# Patient Record
Sex: MALE | Race: WHITE | NOT HISPANIC OR LATINO | Employment: FULL TIME | URBAN - METROPOLITAN AREA
[De-identification: names, ages, dates, MRNs, and addresses within clinical notes are randomized per-mention and may not be internally consistent; named-entity substitution may affect disease eponyms.]

---

## 2021-07-28 ENCOUNTER — HOSPITAL ENCOUNTER (EMERGENCY)
Facility: HOSPITAL | Age: 31
Discharge: HOME | End: 2021-07-29
Attending: EMERGENCY MEDICINE

## 2021-07-28 DIAGNOSIS — J06.9 VIRAL URI WITH COUGH: Primary | ICD-10-CM

## 2021-07-28 DIAGNOSIS — U07.1 COVID-19 VIRUS INFECTION: ICD-10-CM

## 2021-07-28 PROCEDURE — 99285 EMERGENCY DEPT VISIT HI MDM: CPT | Performed by: EMERGENCY MEDICINE

## 2021-07-28 PROCEDURE — 99284 EMERGENCY DEPT VISIT MOD MDM: CPT

## 2021-07-28 RX ORDER — PREDNISONE 20 MG/1
40 TABLET ORAL ONCE
Status: CANCELLED | OUTPATIENT
Start: 2021-07-29 | End: 2021-07-29

## 2021-07-29 ENCOUNTER — APPOINTMENT (EMERGENCY)
Dept: RADIOLOGY | Facility: HOSPITAL | Age: 31
End: 2021-07-29

## 2021-07-29 VITALS
OXYGEN SATURATION: 96 % | DIASTOLIC BLOOD PRESSURE: 96 MMHG | BODY MASS INDEX: 23.18 KG/M2 | RESPIRATION RATE: 16 BRPM | SYSTOLIC BLOOD PRESSURE: 141 MMHG | WEIGHT: 148 LBS | HEART RATE: 92 BPM | TEMPERATURE: 98.3 F

## 2021-07-29 LAB
ATRIAL RATE: 76 BPM
P AXIS: 62 DEGREES
PR INTERVAL: 118 MS
QRS AXIS: 83 DEGREES
QRSD INTERVAL: 102 MS
QT INTERVAL: 384 MS
QTC INTERVAL: 432 MS
SARS-COV-2 RNA RESP QL NAA+PROBE: POSITIVE
T WAVE AXIS: 49 DEGREES
VENTRICULAR RATE: 76 BPM

## 2021-07-29 PROCEDURE — U0005 INFEC AGEN DETEC AMPLI PROBE: HCPCS | Performed by: EMERGENCY MEDICINE

## 2021-07-29 PROCEDURE — 93010 ELECTROCARDIOGRAM REPORT: CPT | Performed by: INTERNAL MEDICINE

## 2021-07-29 PROCEDURE — 93005 ELECTROCARDIOGRAM TRACING: CPT

## 2021-07-29 PROCEDURE — U0003 INFECTIOUS AGENT DETECTION BY NUCLEIC ACID (DNA OR RNA); SEVERE ACUTE RESPIRATORY SYNDROME CORONAVIRUS 2 (SARS-COV-2) (CORONAVIRUS DISEASE [COVID-19]), AMPLIFIED PROBE TECHNIQUE, MAKING USE OF HIGH THROUGHPUT TECHNOLOGIES AS DESCRIBED BY CMS-2020-01-R: HCPCS | Performed by: EMERGENCY MEDICINE

## 2021-07-29 PROCEDURE — 71046 X-RAY EXAM CHEST 2 VIEWS: CPT

## 2021-07-29 NOTE — ED ATTENDING ATTESTATION
7/28/2021  I, Becca Evans DO, saw and evaluated the patient  I have discussed the patient with the resident/non-physician practitioner and agree with the resident's/non-physician practitioner's findings, Plan of Care, and MDM as documented in the resident's/non-physician practitioner's note, except where noted  All available labs and Radiology studies were reviewed  I was present for key portions of any procedure(s) performed by the resident/non-physician practitioner and I was immediately available to provide assistance  At this point I agree with the current assessment done in the Emergency Department  I have conducted an independent evaluation of this patient a history and physical is as follows:    44-year-old male patient with history of asthma says the last 3 days he has had some mild shortness of breath, relatively constant but gets worse when he is in a cold environment  He has some slight cough productive of whitish sputum  No fever, no chills, no change in taste or smell, no chest pain, no palpitations, no orthopnea, no dyspnea on exertion  No myalgias or arthralgias, no travel history, sick contacts  Was vaccinated against COVID with the [employer] vaccine, in May of 2021    General:  Patient is well-appearing  Head:  Atraumatic  Eyes:  Conjunctiva pink  ENT:  Mucous membranes are moist  Neck:  Supple  Cardiac:  S1-S2, without murmurs  Lungs:  Clear to auscultation bilaterally  Abdomen:  Soft, nontender, normal bowel sounds, no CVA tenderness, no tympany, no rigidity, no guarding  Extremities:  Normal range of motion, no pedal edema or calf asymmetry, radial pulses are equal and symmetric bilaterally  Neurologic:  Awake, fluent speech, normal comprehension, AAOx3  Skin:  Pink warm and dry  Psychiatric:  Alert, pleasant, cooperative      ED Course     Suspect the patient may have a viral syndrome, Plan is for ECG and chest x-ray, as well as COVID swab  I do not believe this patient's complaints are from pulmonary embolism and I believe they would most likely be harmed through false positive test results and other complications of testing by further pursuing the diagnosis of pulmonary embolism  If unremarkable, will discharge with outpatient follow-up   Signed out to Dr Wendy Wilkins Time  Procedures

## 2021-11-18 ENCOUNTER — HOSPITAL ENCOUNTER (OUTPATIENT)
Dept: RADIOLOGY | Facility: HOSPITAL | Age: 31
Discharge: HOME | End: 2021-11-18
Attending: PODIATRIST

## 2021-11-18 DIAGNOSIS — S90.31XA CONTUSION OF RIGHT FOOT, INITIAL ENCOUNTER: ICD-10-CM

## 2021-11-18 PROCEDURE — G1004 CDSM NDSC: HCPCS

## 2021-11-18 PROCEDURE — 73718 MRI LOWER EXTREMITY W/O DYE: CPT

## 2022-11-28 ENCOUNTER — HOSPITAL ENCOUNTER (EMERGENCY)
Facility: HOSPITAL | Age: 32
Discharge: HOME | End: 2022-11-29
Attending: EMERGENCY MEDICINE

## 2022-11-28 VITALS
TEMPERATURE: 100.7 F | RESPIRATION RATE: 18 BRPM | SYSTOLIC BLOOD PRESSURE: 138 MMHG | HEART RATE: 90 BPM | OXYGEN SATURATION: 95 % | DIASTOLIC BLOOD PRESSURE: 79 MMHG

## 2022-11-28 DIAGNOSIS — J10.1 INFLUENZA A: Primary | ICD-10-CM

## 2022-11-28 NOTE — Clinical Note
Darlene Bowlingsus was seen and treated in our emergency department on 11/28/2022  No restrictions            Diagnosis: Influenza    Peña Edgar  may return to work on return date  He may return on this date: 12/03/2022         If you have any questions or concerns, please don't hesitate to call        Abdulaziz Gaming MD    ______________________________           _______________          _______________  Hospital Representative                              Date                                Time

## 2022-11-29 ENCOUNTER — APPOINTMENT (EMERGENCY)
Dept: RADIOLOGY | Facility: HOSPITAL | Age: 32
End: 2022-11-29

## 2022-11-29 LAB
ALBUMIN SERPL BCP-MCNC: 4.5 G/DL (ref 3.5–5)
ALP SERPL-CCNC: 46 U/L (ref 34–104)
ALT SERPL W P-5'-P-CCNC: 12 U/L (ref 7–52)
ANION GAP SERPL CALCULATED.3IONS-SCNC: 10 MMOL/L (ref 4–13)
AST SERPL W P-5'-P-CCNC: 15 U/L (ref 13–39)
ATRIAL RATE: 89 BPM
BASOPHILS # BLD AUTO: 0.02 THOUSANDS/ÂΜL (ref 0–0.1)
BASOPHILS NFR BLD AUTO: 0 % (ref 0–1)
BILIRUB SERPL-MCNC: 1.09 MG/DL (ref 0.2–1)
BUN SERPL-MCNC: 19 MG/DL (ref 5–25)
CALCIUM SERPL-MCNC: 9.7 MG/DL (ref 8.4–10.2)
CARDIAC TROPONIN I PNL SERPL HS: 2 NG/L
CHLORIDE SERPL-SCNC: 102 MMOL/L (ref 96–108)
CO2 SERPL-SCNC: 26 MMOL/L (ref 21–32)
CREAT SERPL-MCNC: 0.84 MG/DL (ref 0.6–1.3)
EOSINOPHIL # BLD AUTO: 0.15 THOUSAND/ÂΜL (ref 0–0.61)
EOSINOPHIL NFR BLD AUTO: 2 % (ref 0–6)
ERYTHROCYTE [DISTWIDTH] IN BLOOD BY AUTOMATED COUNT: 12.7 % (ref 11.6–15.1)
FLUAV RNA RESP QL NAA+PROBE: POSITIVE
FLUBV RNA RESP QL NAA+PROBE: NEGATIVE
GFR SERPL CREATININE-BSD FRML MDRD: 116 ML/MIN/1.73SQ M
GLUCOSE SERPL-MCNC: 90 MG/DL (ref 65–140)
HCT VFR BLD AUTO: 45.9 % (ref 36.5–49.3)
HGB BLD-MCNC: 15.7 G/DL (ref 12–17)
IMM GRANULOCYTES # BLD AUTO: 0.02 THOUSAND/UL (ref 0–0.2)
IMM GRANULOCYTES NFR BLD AUTO: 0 % (ref 0–2)
LIPASE SERPL-CCNC: 12 U/L (ref 11–82)
LYMPHOCYTES # BLD AUTO: 0.34 THOUSANDS/ÂΜL (ref 0.6–4.47)
LYMPHOCYTES NFR BLD AUTO: 5 % (ref 14–44)
MCH RBC QN AUTO: 30.5 PG (ref 26.8–34.3)
MCHC RBC AUTO-ENTMCNC: 34.2 G/DL (ref 31.4–37.4)
MCV RBC AUTO: 89 FL (ref 82–98)
MONOCYTES # BLD AUTO: 0.57 THOUSAND/ÂΜL (ref 0.17–1.22)
MONOCYTES NFR BLD AUTO: 9 % (ref 4–12)
NEUTROPHILS # BLD AUTO: 5.61 THOUSANDS/ÂΜL (ref 1.85–7.62)
NEUTS SEG NFR BLD AUTO: 84 % (ref 43–75)
NRBC BLD AUTO-RTO: 0 /100 WBCS
P AXIS: 39 DEGREES
PLATELET # BLD AUTO: 150 THOUSANDS/UL (ref 149–390)
PMV BLD AUTO: 10.3 FL (ref 8.9–12.7)
POTASSIUM SERPL-SCNC: 3.8 MMOL/L (ref 3.5–5.3)
PR INTERVAL: 118 MS
PROT SERPL-MCNC: 7.7 G/DL (ref 6.4–8.4)
QRS AXIS: 21 DEGREES
QRSD INTERVAL: 108 MS
QT INTERVAL: 350 MS
QTC INTERVAL: 425 MS
RBC # BLD AUTO: 5.15 MILLION/UL (ref 3.88–5.62)
RSV RNA RESP QL NAA+PROBE: NEGATIVE
SARS-COV-2 RNA RESP QL NAA+PROBE: NEGATIVE
SODIUM SERPL-SCNC: 138 MMOL/L (ref 135–147)
T WAVE AXIS: 51 DEGREES
VENTRICULAR RATE: 89 BPM
WBC # BLD AUTO: 6.71 THOUSAND/UL (ref 4.31–10.16)

## 2022-11-29 RX ORDER — ONDANSETRON 2 MG/ML
4 INJECTION INTRAMUSCULAR; INTRAVENOUS ONCE
Status: COMPLETED | OUTPATIENT
Start: 2022-11-29 | End: 2022-11-29

## 2022-11-29 RX ORDER — PANTOPRAZOLE SODIUM 40 MG/10ML
40 INJECTION, POWDER, LYOPHILIZED, FOR SOLUTION INTRAVENOUS ONCE
Status: COMPLETED | OUTPATIENT
Start: 2022-11-29 | End: 2022-11-29

## 2022-11-29 RX ORDER — ACETAMINOPHEN 160 MG/5ML
650 SUSPENSION, ORAL (FINAL DOSE FORM) ORAL ONCE
Status: COMPLETED | OUTPATIENT
Start: 2022-11-29 | End: 2022-11-29

## 2022-11-29 RX ADMIN — Medication 650 MG: at 00:56

## 2022-11-29 RX ADMIN — SODIUM CHLORIDE 1000 ML: 0.9 INJECTION, SOLUTION INTRAVENOUS at 00:34

## 2022-11-29 RX ADMIN — ONDANSETRON 4 MG: 2 INJECTION INTRAMUSCULAR; INTRAVENOUS at 00:34

## 2022-11-29 RX ADMIN — PANTOPRAZOLE SODIUM 40 MG: 40 INJECTION, POWDER, FOR SOLUTION INTRAVENOUS at 00:34

## 2022-11-29 NOTE — ED PROVIDER NOTES
History  Chief Complaint   Patient presents with   • Abdominal Pain     Pt states he has cough, fever, vomiting blood x 1, abdominal pain, chest pain, headache all started day  28-year-old male with no significant past medical history presents to the emergency department for evaluation of flu like symptoms  The patient reports that he went to bed feeling well and then around 9:00 a m  this morning developed a cough and chills  He reports that he went to work at 9:00 p m  and then had an episode of nausea and vomiting  He reports that part of his vomit was red and he believes it is blood  Denies prior history of vomiting blood  He has associated chest pain that he describes as a dull 2/10 but becomes sharp and more intense with coughing  He also reports having epigastric abdominal pain  He did not take any medications to treat his symptoms prior to arrival   The patient states that he initially called for an ambulance which brought him to a different emergency department  He states that when he arrived at that emergency department he was placed in the waiting room and was told that it would be 2 hours before he would be seen so he left and came here for evaluation  The patient states his last bowel movement was yesterday and was normal for him  None       History reviewed  No pertinent past medical history  History reviewed  No pertinent surgical history  History reviewed  No pertinent family history  I have reviewed and agree with the history as documented  E-Cigarette/Vaping   • E-Cigarette Use Never User      E-Cigarette/Vaping Substances     Social History     Tobacco Use   • Smoking status: Never   • Smokeless tobacco: Never   Vaping Use   • Vaping Use: Never used   Substance Use Topics   • Alcohol use: Yes     Comment: social   • Drug use: Yes     Types: Marijuana       Review of Systems   Constitutional: Positive for chills and fever     HENT: Negative for ear pain and sore throat  Eyes: Negative for pain and visual disturbance  Respiratory: Positive for cough  Negative for shortness of breath  Cardiovascular: Positive for chest pain  Negative for palpitations  Gastrointestinal: Positive for abdominal pain, nausea and vomiting  Negative for diarrhea  Genitourinary: Negative for dysuria and hematuria  Musculoskeletal: Negative for arthralgias and back pain  Skin: Negative for color change and rash  Neurological: Negative for seizures and syncope  All other systems reviewed and are negative  Physical Exam  Physical Exam  Vitals and nursing note reviewed  Constitutional:       General: He is not in acute distress  Appearance: He is well-developed  He is ill-appearing  HENT:      Head: Normocephalic and atraumatic  Mouth/Throat:      Pharynx: Oropharynx is clear  Eyes:      Conjunctiva/sclera: Conjunctivae normal    Cardiovascular:      Rate and Rhythm: Normal rate and regular rhythm  Pulses:           Radial pulses are 2+ on the right side and 2+ on the left side  Dorsalis pedis pulses are 2+ on the right side and 2+ on the left side  Heart sounds: No murmur heard  Pulmonary:      Effort: Pulmonary effort is normal  No respiratory distress  Breath sounds: Normal breath sounds  Abdominal:      Palpations: Abdomen is soft  Tenderness: There is abdominal tenderness (mild) in the epigastric area  Musculoskeletal:         General: No swelling  Cervical back: Neck supple  Skin:     General: Skin is warm and dry  Capillary Refill: Capillary refill takes less than 2 seconds  Neurological:      Mental Status: He is alert and oriented to person, place, and time     Psychiatric:         Mood and Affect: Mood normal          Behavior: Behavior normal          Vital Signs  ED Triage Vitals [11/28/22 2344]   Temperature Pulse Respirations Blood Pressure SpO2   (!) 100 7 °F (38 2 °C) 90 18 138/79 95 %      Temp src Heart Rate Source Patient Position - Orthostatic VS BP Location FiO2 (%)   -- Monitor Lying Left arm --      Pain Score       3           Vitals:    11/28/22 2344   BP: 138/79   Pulse: 90   Patient Position - Orthostatic VS: Lying         Visual Acuity      ED Medications  Medications   sodium chloride 0 9 % bolus 1,000 mL (0 mL Intravenous Stopped 11/29/22 0103)   ondansetron (ZOFRAN) injection 4 mg (4 mg Intravenous Given 11/29/22 0034)   pantoprazole (PROTONIX) injection 40 mg (40 mg Intravenous Given 11/29/22 0034)   acetaminophen (TYLENOL) oral suspension 650 mg (650 mg Oral Given 11/29/22 0056)       Diagnostic Studies  Results Reviewed     Procedure Component Value Units Date/Time    FLU/RSV/COVID - if FLU/RSV clinically relevant [[id number]]  (Abnormal) Collected: 11/29/22 0007    Lab Status: Final result Specimen: Nares from Nose Updated: 11/29/22 0048     SARS-CoV-2 Negative     INFLUENZA A PCR Positive     INFLUENZA B PCR Negative     RSV PCR Negative    Narrative:      FOR PEDIATRIC PATIENTS - copy/paste COVID Guidelines URL to browser: [email or web address]    SARS-CoV-2 assay is a Nucleic Acid Amplification assay intended for the  qualitative detection of nucleic acid from SARS-CoV-2 in nasopharyngeal  swabs  Results are for the presumptive identification of SARS-CoV-2 RNA  Positive results are indicative of infection with SARS-CoV-2, the virus  causing COVID-19, but do not rule out bacterial infection or co-infection  with other viruses  Laboratories within the [place] and its  territories are required to report all positive results to the appropriate  public health authorities  Negative results do not preclude SARS-CoV-2  infection and should not be used as the sole basis for treatment or other  patient management decisions   Negative results must be combined with  clinical observations, patient history, and epidemiological information  This test has not been FDA cleared or approved  This test has been authorized by FDA under an Emergency Use Authorization  (EUA)  This test is only authorized for the duration of time the  declaration that circumstances exist justifying the authorization of the  emergency use of an in vitro diagnostic tests for detection of SARS-CoV-2  virus and/or diagnosis of COVID-19 infection under section 564(b)(1) of  the Act, 21 U  S C  420XOF-4(E)(2), unless the authorization is terminated  or revoked sooner  The test has been validated but independent review by FDA  and CLIA is pending  Test performed using [employer] GeneXpert: This RT-PCR assay targets N2,  a region unique to SARS-CoV-2  A conserved region in the E-gene was chosen  for pan-Sarbecovirus detection which includes SARS-CoV-2  According to CMS-2020-01-R, this platform meets the definition of high-throughput technology      HS Troponin 0hr (reflex protocol) [[id number]]  (Normal) Collected: 11/29/22 0007    Lab Status: Final result Specimen: Blood from Arm, Left Updated: 11/29/22 0035     hs TnI 0hr 2 ng/L     Comprehensive metabolic panel [[id number]]  (Abnormal) Collected: 11/29/22 0007    Lab Status: Final result Specimen: Blood from Arm, Left Updated: 11/29/22 0029     Sodium 138 mmol/L      Potassium 3 8 mmol/L      Chloride 102 mmol/L      CO2 26 mmol/L      ANION GAP 10 mmol/L      BUN 19 mg/dL      Creatinine 0 84 mg/dL      Glucose 90 mg/dL      Calcium 9 7 mg/dL      AST 15 U/L      ALT 12 U/L      Alkaline Phosphatase 46 U/L      Total Protein 7 7 g/dL      Albumin 4 5 g/dL      Total Bilirubin 1 09 mg/dL      eGFR 116 ml/min/1 73sq m     Narrative:      Michelle guidelines for Chronic Kidney Disease (CKD):   •  Stage 1 with normal or high GFR (GFR > 90 mL/min/1 73 square meters)  •  Stage 2 Mild CKD (GFR = 60-89 mL/min/1 73 square meters)  •  Stage 3A Moderate CKD (GFR = 45-59 mL/min/1 73 square meters)  • Stage 3B Moderate CKD (GFR = 30-44 mL/min/1 73 square meters)  •  Stage 4 Severe CKD (GFR = 15-29 mL/min/1 73 square meters)  •  Stage 5 End Stage CKD (GFR <15 mL/min/1 73 square meters)  Note: GFR calculation is accurate only with a steady state creatinine    Lipase [[id number]]  (Normal) Collected: 11/29/22 0007    Lab Status: Final result Specimen: Blood from Arm, Left Updated: 11/29/22 0029     Lipase 12 u/L     CBC and differential [[id number]]  (Abnormal) Collected: 11/29/22 0007    Lab Status: Final result Specimen: Blood from Arm, Left Updated: 11/29/22 0015     WBC 6 71 Thousand/uL      RBC 5 15 Million/uL      Hemoglobin 15 7 g/dL      Hematocrit 45 9 %      MCV 89 fL      MCH 30 5 pg      MCHC 34 2 g/dL      RDW 12 7 %      MPV 10 3 fL      Platelets 151 Thousands/uL      nRBC 0 /100 WBCs      Neutrophils Relative 84 %      Immat GRANS % 0 %      Lymphocytes Relative 5 %      Monocytes Relative 9 %      Eosinophils Relative 2 %      Basophils Relative 0 %      Neutrophils Absolute 5 61 Thousands/µL      Immature Grans Absolute 0 02 Thousand/uL      Lymphocytes Absolute 0 34 Thousands/µL      Monocytes Absolute 0 57 Thousand/µL      Eosinophils Absolute 0 15 Thousand/µL      Basophils Absolute 0 02 Thousands/µL                  XR chest 2 views   ED Interpretation by Cassandra Price MD (11/29 0038)   No acute cardiopulmonary process                 Procedures  ECG 12 Lead Documentation Only    Date/Time: 11/29/2022 12:11 AM  Performed by: Cassandra Price MD  Authorized by: Cassandra Price MD     ECG reviewed by me, the ED Provider: yes    Patient location:  ED  Previous ECG:     Previous ECG:  Unavailable    Comparison to cardiac monitor: Yes    Interpretation:     Interpretation: normal    Rate:     ECG rate assessment: normal    Rhythm:     Rhythm: sinus rhythm    Ectopy:     Ectopy: none    QRS:     QRS axis:  Normal  Conduction:     Conduction: normal    ST segments:     ST segments: Normal  T waves:     T waves: normal               ED Course                 SBIRT 22yo+    Flowsheet Row Most Recent Value   SBIRT (23 yo +)    In order to provide better care to our patients, we are screening all of our patients for alcohol and drug use  Would it be okay to ask you these screening questions? No Filed at: 11/28/2022 7756                    [facility]  Number of Diagnoses or Management Options  Influenza A  Diagnosis management comments: 20-year-old male presented to the emergency department for evaluation of flu like symptoms  On arrival the patient was awake, alert and in no acute distress  Initial vital signs show that the patient was febrile with a temperature of 100 7°  Patient had mild tenderness to palpation of his epigastric area  Physical exam was otherwise unremarkable  Workup done in the emergency department showed the patient was positive for influenza A  Workup was otherwise unremarkable  The patient was treated symptomatically and on re-evaluation reported some improvement of his symptoms  All diagnostic studies were discussed with the patient in detail  He is appropriate for discharge at this time  Recommendation was made to establish care with a PCP and to follow-up for continued symptoms  Return precautions were discussed  Patient agrees with the plan for discharge and feels comfortable to go home with proper f/u  Advised to return for worsening or additional problems  Diagnostic tests were reviewed and questions answered  Diagnosis, care plan and treatment options were discussed  The patient understands instructions and will follow up as directed            Disposition  Final diagnoses:   Influenza A     Time reflects when diagnosis was documented in both MDM as applicable and the Disposition within this note     Time User Action Codes Description Comment    11/29/2022 12:50 AM Felix Lord Add [J10 1] Influenza A       ED Disposition     ED Disposition   Discharge Condition   Stable    Date/Time   Tue Nov 29, 2022 12:50 AM    Comment   Leonie Skill discharge to home/self care  Follow-up Information     Follow up With Specialties Details Why Contact Info Additional Information    Infolink  Call  To establish care with a PCP [phone]  [address] [place] [address] Emergency Department Emergency Medicine Go to  If symptoms worsen [id number] [facility],[phone]  [address] Emergency Department, [address], [address] [place] [address]          There are no discharge medications for this patient  No discharge procedures on file      PDMP Review     None          ED Provider  Electronically Signed by           Miroslava Chaves MD  11/29/22 0905

## 2024-12-26 ENCOUNTER — HOSPITAL ENCOUNTER (EMERGENCY)
Facility: HOSPITAL | Age: 34
Discharge: HOME | End: 2024-12-26
Attending: EMERGENCY MEDICINE
Payer: COMMERCIAL

## 2024-12-26 VITALS
TEMPERATURE: 98.2 F | SYSTOLIC BLOOD PRESSURE: 151 MMHG | RESPIRATION RATE: 18 BRPM | OXYGEN SATURATION: 99 % | HEART RATE: 76 BPM | DIASTOLIC BLOOD PRESSURE: 94 MMHG

## 2024-12-26 DIAGNOSIS — K08.89 DENTALGIA: ICD-10-CM

## 2024-12-26 DIAGNOSIS — K04.7 DENTAL INFECTION: Primary | ICD-10-CM

## 2024-12-26 PROCEDURE — 99282 EMERGENCY DEPT VISIT SF MDM: CPT

## 2024-12-26 PROCEDURE — 99284 EMERGENCY DEPT VISIT MOD MDM: CPT | Performed by: EMERGENCY MEDICINE

## 2024-12-26 RX ORDER — ACETAMINOPHEN 325 MG/1
650 TABLET ORAL ONCE
Status: COMPLETED | OUTPATIENT
Start: 2024-12-26 | End: 2024-12-26

## 2024-12-26 RX ORDER — AMOXICILLIN 500 MG/1
500 CAPSULE ORAL EVERY 8 HOURS SCHEDULED
Qty: 15 CAPSULE | Refills: 0 | Status: SHIPPED | OUTPATIENT
Start: 2024-12-26 | End: 2024-12-31

## 2024-12-26 RX ORDER — AMOXICILLIN 250 MG/1
500 CAPSULE ORAL ONCE
Status: COMPLETED | OUTPATIENT
Start: 2024-12-26 | End: 2024-12-26

## 2024-12-26 RX ORDER — CHLORHEXIDINE GLUCONATE ORAL RINSE 1.2 MG/ML
15 SOLUTION DENTAL 2 TIMES DAILY
Qty: 420 ML | Refills: 0 | Status: SHIPPED | OUTPATIENT
Start: 2024-12-26 | End: 2025-01-09

## 2024-12-26 RX ORDER — NAPROXEN SODIUM 220 MG/1
220 TABLET, FILM COATED ORAL 2 TIMES DAILY WITH MEALS
Qty: 60 TABLET | Refills: 0 | Status: SHIPPED | OUTPATIENT
Start: 2024-12-26

## 2024-12-26 RX ORDER — IBUPROFEN 600 MG/1
600 TABLET, FILM COATED ORAL ONCE
Status: COMPLETED | OUTPATIENT
Start: 2024-12-26 | End: 2024-12-26

## 2024-12-26 RX ORDER — SENNOSIDES 8.6 MG
650 CAPSULE ORAL EVERY 8 HOURS PRN
Qty: 30 TABLET | Refills: 0 | Status: SHIPPED | OUTPATIENT
Start: 2024-12-26

## 2024-12-26 RX ADMIN — AMOXICILLIN 500 MG: 250 CAPSULE ORAL at 17:51

## 2024-12-26 RX ADMIN — ACETAMINOPHEN 650 MG: 325 TABLET, FILM COATED ORAL at 17:51

## 2024-12-26 RX ADMIN — IBUPROFEN 600 MG: 600 TABLET, FILM COATED ORAL at 17:51

## 2024-12-26 NOTE — ED PROVIDER NOTES
Time reflects when diagnosis was documented in both MDM as applicable and the Disposition within this note       Time User Action Codes Description Comment    12/26/2024  5:45 PM Germania Barbosa Add [K04.7] Dental infection     12/26/2024  5:47 PM Germania Barbosa Add [K08.89] Dentalgia           ED Disposition       ED Disposition   Discharge    Condition   Stable    Date/Time   Thu Dec 26, 2024  5:52 PM    Comment   Jose J Chen discharge to home/self care.                   Assessment & Plan       Medical Decision Making  Risk  OTC drugs.  Prescription drug management.      Patient is a 34 y.o. male   who presents to the ED with CC dental pain for approximately one week. Has been taking naproxen and intermittently taking amoxicllin     Vital signs stable, afebrile. Exam as listed below.      Differential diagnosis includes but is not limited to chronically poor presentation with dental caries; gingivitis, possible periapical abscess.  No abscess present on my exam that is able to be drained.    Plan - We will give tylenol/motrin for pain.  - We will write for chlorhexidine to sterilize the area and prevent further worsening of infection or repeat infection.  - Given the appearance, we will also do antibiotics:  - The patient will follow-up with her primary care or dentist for re-evaluation of her symptoms.  - The patient has no red flags for Jerry's or serious dental infection at this juncture. The patient doesn't appear toxic, nor has rapid progression of symptoms. Patient isn't immunocompromised. Patient has no SOB nor trouble swallowing. Patient doesn't appear dehydrated nor demonstrates trismus on exam.       View ED course above for further discussion on patient workup.     All labs reviewed and utilized in the medical decision making process  All radiology studies independently viewed by me and interpreted by the radiologist.  I reviewed all testing with the patient.     Upon re-evaluation patient stable  for discharge.  Information on dental clinics and dental referral provided.           Medications   amoxicillin (AMOXIL) capsule 500 mg (500 mg Oral Given 12/26/24 1751)   acetaminophen (TYLENOL) tablet 650 mg (650 mg Oral Given 12/26/24 1751)   ibuprofen (MOTRIN) tablet 600 mg (600 mg Oral Given 12/26/24 1751)       ED Risk Strat Scores                          SBIRT 22yo+      Flowsheet Row Most Recent Value   Initial Alcohol Screen: US AUDIT-C     1. How often do you have a drink containing alcohol? 0 Filed at: 12/26/2024 1732   2. How many drinks containing alcohol do you have on a typical day you are drinking?  0 Filed at: 12/26/2024 1732   3a. Male UNDER 65: How often do you have five or more drinks on one occasion? 0 Filed at: 12/26/2024 1732   3b. FEMALE Any Age, or MALE 65+: How often do you have 4 or more drinks on one occassion? 0 Filed at: 12/26/2024 1732   Audit-C Score 0 Filed at: 12/26/2024 1732   RATNA: How many times in the past year have you...    Used an illegal drug or used a prescription medication for non-medical reasons? Never Filed at: 12/26/2024 1732                            History of Present Illness       Chief Complaint   Patient presents with    Dental Pain     Pt c/o L upper dental pain x 2 weeks.        History reviewed. No pertinent past medical history.   History reviewed. No pertinent surgical history.   History reviewed. No pertinent family history.   Social History     Tobacco Use    Smoking status: Never    Smokeless tobacco: Never   Vaping Use    Vaping status: Never Used   Substance Use Topics    Alcohol use: Yes     Comment: social    Drug use: Yes     Types: Marijuana      E-Cigarette/Vaping    E-Cigarette Use Never User       E-Cigarette/Vaping Substances      I have reviewed and agree with the history as documented.     34-year-old male with chief complaint dentalgia        Review of Systems   Constitutional:  Negative for activity change, appetite change, chills and  fever.   HENT:  Positive for dental problem. Negative for congestion, drooling, facial swelling, rhinorrhea, trouble swallowing and voice change.    Respiratory:  Negative for apnea, cough and shortness of breath.    Gastrointestinal:  Negative for abdominal distention, nausea and vomiting.   Skin:  Negative for color change and rash.   Psychiatric/Behavioral:  Negative for confusion.            Objective       ED Triage Vitals [12/26/24 1702]   Temperature Pulse Blood Pressure Respirations SpO2 Patient Position - Orthostatic VS   98.2 °F (36.8 °C) 76 151/94 18 99 % --      Temp Source Heart Rate Source BP Location FiO2 (%) Pain Score    Temporal Monitor Left arm -- 10 - Worst Possible Pain      Vitals      Date and Time Temp Pulse SpO2 Resp BP Pain Score FACES Pain Rating User   12/26/24 1751 -- -- -- -- -- 10 - Worst Possible Pain -- VB   12/26/24 1702 98.2 °F (36.8 °C) 76 99 % 18 151/94 10 - Worst Possible Pain -- ST            Physical Exam  Constitutional:       Appearance: Normal appearance.   HENT:      Head: Normocephalic and atraumatic.      Nose: Nose normal.      Mouth/Throat:      Comments: Multiple dental caries; significant decay of the left premolars.  There is tenderness along the gingival line in this area but no fluctuance.  There is diffuse periodontal disease.  No facial swelling.  No tongue elevation.  No induration underneath the tongue.  No uvular deviation.  Patient tolerating his own saliva.  No voice change  Eyes:      Pupils: Pupils are equal, round, and reactive to light.   Cardiovascular:      Rate and Rhythm: Normal rate.   Pulmonary:      Effort: Pulmonary effort is normal.   Abdominal:      General: Abdomen is flat.      Palpations: Abdomen is soft.   Skin:     General: Skin is warm and dry.      Capillary Refill: Capillary refill takes less than 2 seconds.   Neurological:      General: No focal deficit present.      Mental Status: He is alert and oriented to person, place, and time.          Results Reviewed       None            No orders to display       Procedures    ED Medication and Procedure Management   None     Discharge Medication List as of 12/26/2024  5:55 PM        START taking these medications    Details   acetaminophen (TYLENOL) 650 mg CR tablet Take 1 tablet (650 mg total) by mouth every 8 (eight) hours as needed for mild pain, Starting u 12/26/2024, Normal      amoxicillin (AMOXIL) 500 mg capsule Take 1 capsule (500 mg total) by mouth every 8 (eight) hours for 5 days, Starting u 12/26/2024, Until Tue 12/31/2024, Normal      chlorhexidine (PERIDEX) 0.12 % solution Apply 15 mL to the mouth or throat 2 (two) times a day for 14 days, Starting u 12/26/2024, Until Thu 1/9/2025, Normal      naproxen sodium (ALEVE) 220 MG tablet Take 1 tablet (220 mg total) by mouth 2 (two) times a day with meals, Starting u 12/26/2024, Normal             ED SEPSIS DOCUMENTATION   Time reflects when diagnosis was documented in both MDM as applicable and the Disposition within this note       Time User Action Codes Description Comment    12/26/2024  5:45 PM Germania Barbosa [K04.7] Dental infection     12/26/2024  5:47 PM Germania Barbosa [K08.89] Dentalgia                  Germania Barbosa MD  12/27/24 0040

## 2024-12-26 NOTE — DISCHARGE INSTRUCTIONS
You were seen today for dental pain. You may have a dental infection. Take amoxicillin every eight hours for 5 days, tylenol/motrin as pain. Your peridex mouthwash for maximum of 14 days. Follow up with dentistry for continued management.     Please use the following pain medications as prescribed:  - Tylenol 650mg every 6 hours  - Motrin 400mg every 6 hours  - Orajel OTC  They work in different ways so can be used together at the same time.     We will give a script for peridex wash. This is an anti-septic which will sterilize the area and prevent and infection from occurring. This can only be used for 2-3 weeks. If you use it longer, it might cause some pink staining of your teeth. It works like a super powered form of Listerine.     Lastly, I am starting you on antibiotics. I would like you to take the full course as prescribed. This should help the pain and swelling.     If despite the above you have worsening symptoms please return to the emergency room for re-evaluation.     If you HAVE A DENTIST, please call to make an appointment with them for follow-up as soon as possible.  If you DO NOT have a dentist, please call [phone]. It is a service that will help you find a dentist in your area and based on your insurance (if you have it or not) after you answer some questions    You may also follow-up with the [facility] if you don't have a density.   Dental Clinic - [facility]  [address] [place] [address] [id number] [phone]  They will see you with or without insurance and have walk in hours in the morning without appointments.     Other local dental clinics include:   DENTAL CLINICS    [facility]  [address] [place] [id number]  [phone]  Walk in house M-F 8a-noon  Emergency Dental Care    [facility]  [address] [place] [address], 2nd [place] [address]  [phone]    Dr. Ryan  Darren  [address] [facility] [place] [address]  Takes adults & children on a waiting list    [facility]  [address] [place] [address]  [phone]  Walk in scheduled only M-F 8a-noon & 1p-4p  Uninsured received 40% discount & payments  Payment must be made upfront before the service  Emergency Dental Care    [facility]  [address] [place] [address]  OR  [address] [place] [address]  [phone]    [facility]  Offices in [facility], [place]  [phone]

## 2024-12-26 NOTE — ED ATTENDING ATTESTATION
Final Diagnoses:     1. Dental infection    2. Dentalgia           Adolfo FINNEGAN MD, saw and evaluated the patient. All available labs and X-rays were ordered by me or the resident / non-physician and have been reviewed by myself. I discussed the patient with the resident / non-physician and agree with the resident's / non-physician practitioner's findings and plan as documented in the resident's / non-physician practicitioner's note, except where noted.   At this point, I agree with the current assessment done in the ED.   I was present during key portions of all procedures performed unless otherwise stated.     HPI:  NURSING TRIAGE:    This is a 34 y.o. male presenting for evaluation of dentalgia.  For 1 week has been taken naproxen / amoxicillin for pain intermittently.  Hasn't seen a dentist due to fear. Chief Complaint   Patient presents with    Dental Pain     Pt c/o L upper dental pain x 2 weeks.       PHYSICAL: ASSESSMENT + PLAN:   Pertinent: poor dentition around pre-molars  TTP  Periapical abscess suspected.   Multiple teeth are severely eroded.   Percussive tenderness  There are remnants of braces present.  No lymph nodes  No trismus  Uvula is midline.   No foul smell.     General: VS reviewed  Appears in NAD  awake, alert.   Well-nourished, well-developed. Appears stated age.   Speaking normally in full sentences.   Head: Normocephalic, atraumatic  Eyes: EOM-I. No diplopia.   No hyphema.   No subconjunctival hemorrhages.  Symmetrical lids.   ENT: Atraumatic external nose and ears.    MMM  No malocclusion. No stridor. Normal phonation. No drooling. Normal swallowing.   Neck: No JVD.  CV: No pallor noted  Lungs:   No tachypnea  No respiratory distress  Abd: soft nt nd no rebound/guarding  MSK:   FROM spontaneously  Skin: Dry, intact.   Neuro: Awake, alert, GCS15, CN II-XII grossly intact.   Motor grossly intact.  Psychiatric/Behavioral: interacting normally; appropriate mood/affect.    Exam:  deferred    Vitals:    12/26/24 1702   BP: 151/94   BP Location: Left arm   Pulse: 76   Resp: 18   Temp: 98.2 °F (36.8 °C)   TempSrc: Temporal   SpO2: 99%    - We will give tylenol/motrin for pain.  - We will write for chlorhexidine to sterilize the area and prevent further worsening of infection or repeat infection.  - Given the appearance, we will also do antibiotics:  [    ] PCN 500mg QID  [ x ] Amoxicillin 20-40mg/kg/day TID  [    ] Augmentin 20mg/kg BID  [    ] Clindamycin (PCN allergy or worsening infection after 72 hours of amoxicillin) 8-20mg/kg/day TID  [    ] Azithromycin (alternative to clindamycin) 5-12mg/kg Qday  [    ] Metronidazole (30mg/kg/day QID) + Amoxicillin if suspected resistant infection  - The patient will follow-up with her primary care or dentist for re-evaluation of her symptoms.  - The patient has no red flags for Jerry's or serious dental infection at this juncture. The patient doesn't appear toxic, nor has rapid progression of symptoms. Patient isn't immunocompromised. Patient has no SOB nor trouble swallowing. Patient doesn't appear dehydrated nor demonstrates trismus on exam.   - Refused needles / blocks.      There are no obvious limitations to social determinants of care.   Nursing note reviewed.   Vitals reviewed.   Orders placed by myself and/or advanced practitioner / resident.    Previous chart was reviewed  History obtained from: Patient  Language barrier: None  Limitations to the history obtained: None    Past Medical: Past Surgical:    has no past medical history on file.  has no past surgical history on file.   Social: Cardiac (Echo/Cath)   Social History     Substance and Sexual Activity   Alcohol Use Yes    Comment: social     Social History     Tobacco Use   Smoking Status Never   Smokeless Tobacco Never     Social History     Substance and Sexual Activity   Drug Use Yes    Types: Marijuana    No results found for this or any previous visit.    No results found for this  or any previous visit.    No results found for this or any previous visit.     Labs: Imaging:   Labs Reviewed - No data to display No orders to display      Medications: Code Status:   Medications   amoxicillin (AMOXIL) capsule 500 mg (500 mg Oral Given 12/26/24 1751)   acetaminophen (TYLENOL) tablet 650 mg (650 mg Oral Given 12/26/24 1751)   ibuprofen (MOTRIN) tablet 600 mg (600 mg Oral Given 12/26/24 1751)    Code Status: No Order  Advance Directive and Living Will:      Power of :    POLST:       Orders Placed This Encounter   Procedures    Ambulatory Referral to Dentistry     Time reflects when diagnosis was documented in both MDM as applicable and the Disposition within this note       Time User Action Codes Description Comment    12/26/2024  5:45 PM Germania Barbosa [K04.7] Dental infection     12/26/2024  5:47 PM Germania Barbosa [K08.89] Dentalgia           ED Disposition       ED Disposition   Discharge    Condition   Stable    Date/Time   Thu Dec 26, 2024  5:52 PM    Comment   Jose J Chen discharge to home/self care.                   Follow-up Information    None       Patient's Medications   Discharge Prescriptions    ACETAMINOPHEN (TYLENOL) 650 MG CR TABLET    Take 1 tablet (650 mg total) by mouth every 8 (eight) hours as needed for mild pain       Start Date: 12/26/2024End Date: --       Order Dose: 650 mg       Quantity: 30 tablet    Refills: 0    AMOXICILLIN (AMOXIL) 500 MG CAPSULE    Take 1 capsule (500 mg total) by mouth every 8 (eight) hours for 5 days       Start Date: 12/26/2024End Date: 12/31/2024       Order Dose: 500 mg       Quantity: 15 capsule    Refills: 0    CHLORHEXIDINE (PERIDEX) 0.12 % SOLUTION    Apply 15 mL to the mouth or throat 2 (two) times a day for 14 days       Start Date: 12/26/2024End Date: 1/9/2025       Order Dose: 15 mL       Quantity: 420 mL    Refills: 0    NAPROXEN SODIUM (ALEVE) 220 MG TABLET    Take 1 tablet (220 mg total) by mouth 2 (two) times a day  "with meals       Start Date: 12/26/2024End Date: --       Order Dose: 220 mg       Quantity: 60 tablet    Refills: 0       None                        Portions of the record may have been created with voice recognition software. Occasional wrong word or \"sound a like\" substitutions may have occurred due to the inherent limitations of voice recognition software. Read the chart carefully and recognize, using context, where substitutions have occurred.    Electronically signed by:  Adolfo Garcia  "

## 2024-12-27 ENCOUNTER — OFFICE VISIT (OUTPATIENT)
Dept: DENTISTRY | Facility: CLINIC | Age: 34
End: 2024-12-27

## 2024-12-27 VITALS — HEART RATE: 67 BPM | DIASTOLIC BLOOD PRESSURE: 79 MMHG | SYSTOLIC BLOOD PRESSURE: 118 MMHG

## 2024-12-27 DIAGNOSIS — K08.89 PAIN, DENTAL: Primary | ICD-10-CM

## 2024-12-27 PROCEDURE — D0140 LIMITED ORAL EVALUATION - PROBLEM FOCUSED: HCPCS

## 2024-12-27 PROCEDURE — D0220 INTRAORAL - PERIAPICAL FIRST RADIOGRAPHIC IMAGE: HCPCS

## 2024-12-27 NOTE — DENTAL PROCEDURE DETAILS
"Limited Exam    Jose J Chen 34 y.o. male presents with self and wife to [place] for Limited exam  PMH reviewed, no changes, ASA II.     Chief complaint:  I had an infection on the top left and it hurt.    Consent:  Discussed that limited exam focuses on problem area, and same day tx is not guaranteed.  Patient explained to if they wish to have anything else evaluated, they need to return to the practice at which they are a patient of record or schedule a comprehensive exam afterwards.  Patient understands and consent was given by self via verbal consent.    Subjective history:   Pt was seen in the ED due to pain in the top left teeth that he has been having for more than 2 weeks. He was prescribed amoxicillin at the ED. Pt reports severe dental anxiety and has not seen a dentist in more than 6 years. He also currently has brackets w/ no wires that were placed more than 10 years ago.    Objective clinical findings:   Oral cancer screening: normal.   Extraoral exam: no remarkable findings.  Intraoral exam: significantly sized caries, gingival inflammation, significant calculus.     Radiographs: Single PA - #12/13.    Assessment:  #12 and 13 non-restorable caries    Plan:   #12 and 13 EXT    When asked if pt would like both teeth extracted today, pt notified us that he has a history of being dismissed from past dental offices. He reports that he has \"punched dentists\" in the past. Pt requested to be sedated for the extractions but would like to come back to [facility] for an exam after #12/13 get extracted.     Referral(s): [facility] for #12/13 EXT.  Rx: None.  Comprehensive care disposition: Patient expressed interest in becoming a patient of record with one of the  dental clinics.    Patient dismissed ambulatory and alert.    Attending: Dr. Bro was present in clinic.   "

## 2024-12-27 NOTE — PROGRESS NOTES
"Procedure Details  12  - INTRAORAL - PERIAPICAL FIRST RADIOGRAPHIC IMAGE   - LIMITED ORAL EVALUATION - PROBLEM FOCUSED  Limited Exam    Jose J Chen 34 y.o. male presents with self and wife to [facility] for Limited exam  PMH reviewed, no changes, ASA II.     Chief complaint:  I had an infection on the top left and it hurt.    Consent:  Discussed that limited exam focuses on problem area, and same day tx is not guaranteed.  Patient explained to if they wish to have anything else evaluated, they need to return to the practice at which they are a patient of record or schedule a comprehensive exam afterwards.  Patient understands and consent was given by self via verbal consent.    Subjective history:   Pt was seen in the ED due to pain in the top left teeth that he has been having for more than 2 weeks. He was prescribed amoxicillin at the ED. Pt reports severe dental anxiety and has not seen a dentist in more than 6 years. He also currently has brackets w/ no wires that were placed more than 10 years ago.    Objective clinical findings:   Oral cancer screening: normal.   Extraoral exam: no remarkable findings.  Intraoral exam: significantly sized caries, gingival inflammation, significant calculus.     Radiographs: Single PA - #12/13 .    Assessment:  #12 and 13 non-restorable caries    Plan:   #12 and 13 EXT    When asked if pt would like both teeth extracted today, pt notified us that he has a history of being dismissed from past dental offices. He reports that he has \"punched dentists\" in the past. Pt requested to be sedated for the extractions but would like to come back to [facility] for an exam after #12/13 get extracted.     Referral(s): [facility] for #12/13 EXT .  Rx: None.  Comprehensive care disposition: Patient expressed interest in becoming a patient of record with one of the  dental clinics.    Patient dismissed ambulatory and alert.    Attending: Dr. Bro was present in clinic.     "

## 2025-04-07 ENCOUNTER — HOSPITAL ENCOUNTER (EMERGENCY)
Facility: HOSPITAL | Age: 35
Discharge: HOME | End: 2025-04-07
Attending: EMERGENCY MEDICINE
Payer: COMMERCIAL

## 2025-04-07 VITALS
TEMPERATURE: 97.5 F | OXYGEN SATURATION: 98 % | SYSTOLIC BLOOD PRESSURE: 152 MMHG | DIASTOLIC BLOOD PRESSURE: 83 MMHG | RESPIRATION RATE: 18 BRPM | HEART RATE: 79 BPM

## 2025-04-07 DIAGNOSIS — Z78.9 USES BRACE: Primary | ICD-10-CM

## 2025-04-07 DIAGNOSIS — K08.89 PAIN, DENTAL: ICD-10-CM

## 2025-04-07 PROCEDURE — 99283 EMERGENCY DEPT VISIT LOW MDM: CPT | Performed by: EMERGENCY MEDICINE

## 2025-04-07 PROCEDURE — 99282 EMERGENCY DEPT VISIT SF MDM: CPT

## 2025-04-07 NOTE — ED ATTENDING ATTESTATION
4/7/2025  I, Randolph Jensen MD, saw and evaluated the patient. I have discussed the patient with the resident/non-physician practitioner and agree with the resident's/non-physician practitioner's findings, Plan of Care, and MDM as documented in the resident's/non-physician practitioner's note, except where noted. All available labs and Radiology studies were reviewed.  I was present for key portions of any procedure(s) performed by the resident/non-physician practitioner and I was immediately available to provide assistance.       At this point I agree with the current assessment done in the Emergency Department.  I have conducted an independent evaluation of this patient a history and physical is as follows:    34-year-old man presenting with irritation to left lower buccal mucosa from his braces.  There is a small area of protruding wire.  Patient is in the process of getting braces removed.  Will cover the wire and instruct close follow-up with his orthodontist.    ED Course         Critical Care Time  Procedures

## 2025-04-07 NOTE — ED PROVIDER NOTES
Time reflects when diagnosis was documented in both MDM as applicable and the Disposition within this note       Time User Action Codes Description Comment    4/7/2025  6:40 PM Marj Loaiza Add [Z78.9] Uses brace     4/7/2025  6:40 PM Marj Loaiza Add [K08.89] Pain, dental           ED Disposition       ED Disposition   Discharge    Condition   Stable    Date/Time   Mon Apr 7, 2025  6:41 PM    Comment   Jose J Gustavo discharge to home/self care.                   Assessment & Plan       Medical Decision Making  Pt is a 34-year-old female coming in with dental breast issue.  Patient has a small wire poking out of his breast that is causing him problems while scraping onto his stomach.  Called his orthodontist and they do not have appointments with July.  Patient has a dentist.    Initial presentation pt is well appearing    On exam   Small piece of brace poking out in his left lower tooth, a lot of dental caries.  General: VS reviewed  Appears in NAD  awake, alert.   Well-nourished, well-developed. Appears stated age.   Speaking normally in full sentences.   Head: Normocephalic, atraumatic  Eyes: EOM-I. No diplopia.   No hyphema.   No subconjunctival hemorrhages.  Symmetrical lids.   ENT: Atraumatic external nose and ears.    MMM  No malocclusion. No stridor. Normal phonation. No drooling. Normal swallowing.   Neck: No JVD.  CV: No pallor noted  Lungs:   No tachypnea  No respiratory distress  MSK:   FROM spontaneously  Skin: Dry, intact.   Neuro: Awake, alert, GCS15, CN II-XII grossly intact.   Motor grossly intact.  Psychiatric/Behavioral: Appropriate mood and affect   Exam: deferred      The dental brace piece is too small to use  to cut it, used dental bone/visible flow to blunt the tip.  Patient had symptomatic relief after it.  Discharged with outpatient dental clinic follow-up.           Medications - No data to display    ED Risk Strat Scores                                                 History of Present Illness       Chief Complaint   Patient presents with    Dental Pain     Pt has braces that have been on for 15 years, states he's in the process of getting them off. States he's here today because there is a small piece of the braces poking his L lower lip.        History reviewed. No pertinent past medical history.   History reviewed. No pertinent surgical history.   History reviewed. No pertinent family history.   Social History     Tobacco Use    Smoking status: Never    Smokeless tobacco: Never   Vaping Use    Vaping status: Never Used   Substance Use Topics    Alcohol use: Yes     Comment: social    Drug use: Yes     Types: Marijuana      E-Cigarette/Vaping    E-Cigarette Use Never User       E-Cigarette/Vaping Substances      I have reviewed and agree with the history as documented.     HPI    Review of Systems        Objective       ED Triage Vitals [04/07/25 1756]   Temperature Pulse Blood Pressure Respirations SpO2 Patient Position - Orthostatic VS   97.5 °F (36.4 °C) 79 152/83 18 98 % --      Temp Source Heart Rate Source BP Location FiO2 (%) Pain Score    Temporal Monitor -- -- --      Vitals      Date and Time Temp Pulse SpO2 Resp BP Pain Score FACES Pain Rating User   04/07/25 1756 97.5 °F (36.4 °C) 79 98 % 18 152/83 -- -- TODD            Physical Exam    Results Reviewed       None            No orders to display       Procedures    ED Medication and Procedure Management   Prior to Admission Medications   Prescriptions Last Dose Informant Patient Reported? Taking?   acetaminophen (TYLENOL) 650 mg CR tablet   No No   Sig: Take 1 tablet (650 mg total) by mouth every 8 (eight) hours as needed for mild pain   naproxen sodium (ALEVE) 220 MG tablet   No No   Sig: Take 1 tablet (220 mg total) by mouth 2 (two) times a day with meals      Facility-Administered Medications: None     Discharge Medication List as of 4/7/2025  6:43 PM        CONTINUE these medications which have NOT CHANGED     Details   acetaminophen (TYLENOL) 650 mg CR tablet Take 1 tablet (650 mg total) by mouth every 8 (eight) hours as needed for mild pain, Starting Thu 12/26/2024, Normal      naproxen sodium (ALEVE) 220 MG tablet Take 1 tablet (220 mg total) by mouth 2 (two) times a day with meals, Starting Thu 12/26/2024, Normal           No discharge procedures on file.  ED SEPSIS DOCUMENTATION   Time reflects when diagnosis was documented in both MDM as applicable and the Disposition within this note       Time User Action Codes Description Comment    4/7/2025  6:40 PM Marj Loaiza [Z78.9] Uses brace     4/7/2025  6:40 PM Marj Loaiza [K08.89] Pain, dental                  Marj Loaiza DO  04/07/25 4119